# Patient Record
Sex: FEMALE | Race: WHITE | Employment: FULL TIME | URBAN - METROPOLITAN AREA
[De-identification: names, ages, dates, MRNs, and addresses within clinical notes are randomized per-mention and may not be internally consistent; named-entity substitution may affect disease eponyms.]

---

## 2017-10-13 ENCOUNTER — HOSPITAL ENCOUNTER (EMERGENCY)
Age: 22
Discharge: HOME | End: 2017-10-13
Attending: EMERGENCY MEDICINE | Admitting: EMERGENCY MEDICINE
Payer: OTHER GOVERNMENT

## 2017-10-13 VITALS
DIASTOLIC BLOOD PRESSURE: 66 MMHG | SYSTOLIC BLOOD PRESSURE: 126 MMHG | HEART RATE: 75 BPM | BODY MASS INDEX: 37.76 KG/M2 | HEIGHT: 61 IN | OXYGEN SATURATION: 100 % | WEIGHT: 200 LBS | TEMPERATURE: 98.6 F | RESPIRATION RATE: 14 BRPM

## 2017-10-13 DIAGNOSIS — N94.6 DYSMENORRHEA: Primary | ICD-10-CM

## 2017-10-13 LAB
ABO + RH BLD: NORMAL
ALBUMIN SERPL-MCNC: 4.4 G/DL (ref 3.4–5)
ALBUMIN/GLOB SERPL: 1.2 {RATIO} (ref 0.8–1.7)
ALP SERPL-CCNC: 69 U/L (ref 45–117)
ALT SERPL-CCNC: 23 U/L (ref 13–56)
ANION GAP SERPL CALC-SCNC: 5 MMOL/L (ref 3–18)
APPEARANCE UR: CLEAR
AST SERPL-CCNC: 13 U/L (ref 15–37)
BACTERIA URNS QL MICRO: NEGATIVE /HPF
BASOPHILS # BLD: 0 K/UL (ref 0–0.06)
BASOPHILS NFR BLD: 0 % (ref 0–2)
BILIRUB SERPL-MCNC: 0.3 MG/DL (ref 0.2–1)
BILIRUB UR QL: NEGATIVE
BUN SERPL-MCNC: 12 MG/DL (ref 7–18)
BUN/CREAT SERPL: 21 (ref 12–20)
CALCIUM SERPL-MCNC: 9.3 MG/DL (ref 8.5–10.1)
CHLORIDE SERPL-SCNC: 106 MMOL/L (ref 100–108)
CO2 SERPL-SCNC: 26 MMOL/L (ref 21–32)
COLOR UR: YELLOW
CREAT SERPL-MCNC: 0.58 MG/DL (ref 0.6–1.3)
DIFFERENTIAL METHOD BLD: ABNORMAL
EOSINOPHIL # BLD: 0.1 K/UL (ref 0–0.4)
EOSINOPHIL NFR BLD: 0 % (ref 0–5)
EPITH CASTS URNS QL MICRO: ABNORMAL /LPF (ref 0–5)
ERYTHROCYTE [DISTWIDTH] IN BLOOD BY AUTOMATED COUNT: 12.3 % (ref 11.6–14.5)
GLOBULIN SER CALC-MCNC: 3.8 G/DL (ref 2–4)
GLUCOSE SERPL-MCNC: 87 MG/DL (ref 74–99)
GLUCOSE UR STRIP.AUTO-MCNC: NEGATIVE MG/DL
HCG UR QL: NEGATIVE
HCT VFR BLD AUTO: 37 % (ref 35–45)
HGB BLD-MCNC: 12.8 G/DL (ref 12–16)
HGB UR QL STRIP: ABNORMAL
KETONES UR QL STRIP.AUTO: 15 MG/DL
LEUKOCYTE ESTERASE UR QL STRIP.AUTO: NEGATIVE
LIPASE SERPL-CCNC: 155 U/L (ref 73–393)
LYMPHOCYTES # BLD: 0.9 K/UL (ref 0.9–3.6)
LYMPHOCYTES NFR BLD: 6 % (ref 21–52)
MCH RBC QN AUTO: 29.6 PG (ref 24–34)
MCHC RBC AUTO-ENTMCNC: 34.6 G/DL (ref 31–37)
MCV RBC AUTO: 85.6 FL (ref 74–97)
MONOCYTES # BLD: 0.6 K/UL (ref 0.05–1.2)
MONOCYTES NFR BLD: 4 % (ref 3–10)
MUCOUS THREADS URNS QL MICRO: ABNORMAL /LPF
NEUTS SEG # BLD: 13.7 K/UL (ref 1.8–8)
NEUTS SEG NFR BLD: 90 % (ref 40–73)
NITRITE UR QL STRIP.AUTO: NEGATIVE
PH UR STRIP: 5.5 [PH] (ref 5–8)
PLATELET # BLD AUTO: 319 K/UL (ref 135–420)
PMV BLD AUTO: 10.1 FL (ref 9.2–11.8)
POTASSIUM SERPL-SCNC: 3.7 MMOL/L (ref 3.5–5.5)
PROT SERPL-MCNC: 8.2 G/DL (ref 6.4–8.2)
PROT UR STRIP-MCNC: ABNORMAL MG/DL
RBC # BLD AUTO: 4.32 M/UL (ref 4.2–5.3)
RBC #/AREA URNS HPF: ABNORMAL /HPF (ref 0–5)
SERVICE CMNT-IMP: NORMAL
SODIUM SERPL-SCNC: 137 MMOL/L (ref 136–145)
SP GR UR REFRACTOMETRY: >1.03 (ref 1–1.03)
UROBILINOGEN UR QL STRIP.AUTO: 0.2 EU/DL (ref 0.2–1)
WBC # BLD AUTO: 15.2 K/UL (ref 4.6–13.2)
WBC URNS QL MICRO: 0 /HPF (ref 0–4)
WET PREP GENITAL: NORMAL

## 2017-10-13 PROCEDURE — 99284 EMERGENCY DEPT VISIT MOD MDM: CPT

## 2017-10-13 PROCEDURE — 81001 URINALYSIS AUTO W/SCOPE: CPT | Performed by: EMERGENCY MEDICINE

## 2017-10-13 PROCEDURE — 81025 URINE PREGNANCY TEST: CPT | Performed by: EMERGENCY MEDICINE

## 2017-10-13 PROCEDURE — 87210 SMEAR WET MOUNT SALINE/INK: CPT | Performed by: PHYSICIAN ASSISTANT

## 2017-10-13 PROCEDURE — 87491 CHLMYD TRACH DNA AMP PROBE: CPT | Performed by: PHYSICIAN ASSISTANT

## 2017-10-13 PROCEDURE — 85025 COMPLETE CBC W/AUTO DIFF WBC: CPT | Performed by: EMERGENCY MEDICINE

## 2017-10-13 PROCEDURE — 86900 BLOOD TYPING SEROLOGIC ABO: CPT | Performed by: EMERGENCY MEDICINE

## 2017-10-13 PROCEDURE — 80053 COMPREHEN METABOLIC PANEL: CPT | Performed by: PHYSICIAN ASSISTANT

## 2017-10-13 PROCEDURE — 83690 ASSAY OF LIPASE: CPT | Performed by: PHYSICIAN ASSISTANT

## 2017-10-13 RX ORDER — ONDANSETRON 4 MG/1
4 TABLET, ORALLY DISINTEGRATING ORAL
Qty: 6 TAB | Refills: 0 | Status: SHIPPED | OUTPATIENT
Start: 2017-10-13

## 2017-10-13 RX ORDER — NAPROXEN 250 MG/1
250 TABLET ORAL 2 TIMES DAILY WITH MEALS
Qty: 10 TAB | Refills: 0 | Status: SHIPPED | OUTPATIENT
Start: 2017-10-13

## 2017-10-13 NOTE — ED TRIAGE NOTES
Pt arrives with c/o abd cramping that started yesterday, pt with spotting on 10/10, and started menses yesterday, pt denies any pain at this time but occurred yesterday and this am, pt attempted to take motrin/tylenol and pamprin for relief

## 2017-10-13 NOTE — ED NOTES
Provider in triage: 1 day really bad period pain  Ordered: hcg ua cbc t&s   DDx: period  Pain, ectopic, miscarriage  Sent to (MAIN treatment area, FAST track): fast

## 2017-10-13 NOTE — Clinical Note
Take Naproxen for menstrual cramping. Do not take on an empty stomach. Try applying heat to lower abdomen to ease cramping. You make take Zofran for nausea. Follow up with your gynecologist regarding menstrual symptoms.

## 2017-10-13 NOTE — ED PROVIDER NOTES
HPI Comments: Jayla Venegas is a 25 y.o. Female with history of dysmenorrhea who is currently on menses presents to the ED c/o 3 days of bilateral lower abdominal pain that patient describes as menstrual cramps. Patient reports initially pain was not controlled with motrin or Pamprin but today she took Pamprin and OxyContin that she had leftover from prior dental surgery with relief of pain when she arrived to ED and states she is currently asymptomatic. Patient reports decreased appetite today, which is typical when she is on her menses, and 1 episode of emesis after taking Pamprin and OxyContin on an empty stomach. Not currently nauseated. Patient reports hot/cold sweats and loose stool today consistent with menstrual symptoms for her. Patient states she has a history of severe menstrual cramps and symptoms since onset of menses at age 14. She saw GYN last year and was offered birth control for symptom control, however, declined such. Patient denies fevers, vaginal discharge or concern for STI. She reports she is in a monogamous relationship. No prior history of anemia or hematologic disorders. Patient is a 25 y.o. female presenting with abdominal pain. The history is provided by the patient. Abdominal Pain    Associated symptoms include diarrhea and vomiting. Pertinent negatives include no fever, no nausea, no constipation, no dysuria, no frequency and no back pain. Past Medical History:   Diagnosis Date    Menorrhagia with regular cycle        No past surgical history on file. Family History:   Problem Relation Age of Onset    No Known Problems Mother     Diabetes Father     Thyroid Disease Sister        Social History     Social History    Marital status:      Spouse name: N/A    Number of children: N/A    Years of education: N/A     Occupational History    Not on file.      Social History Main Topics    Smoking status: Never Smoker    Smokeless tobacco: Never Used  Alcohol use Yes    Drug use: Yes     Special: Marijuana    Sexual activity: Yes     Partners: Male     Birth control/ protection: None     Other Topics Concern    Not on file     Social History Narrative    No narrative on file         ALLERGIES: Review of patient's allergies indicates no known allergies. Review of Systems   Constitutional: Negative for fever. Gastrointestinal: Positive for abdominal pain, diarrhea and vomiting. Negative for blood in stool, constipation and nausea. Genitourinary: Positive for menstrual problem, pelvic pain and vaginal bleeding. Negative for dysuria, flank pain, frequency, urgency and vaginal discharge. Musculoskeletal: Negative for back pain. Skin: Negative for pallor. Vitals:    10/13/17 1235   BP: 128/90   Pulse: 78   Resp: 16   Temp: 97.3 °F (36.3 °C)   SpO2: 100%   Weight: 90.7 kg (200 lb)   Height: 5' 1\" (1.549 m)            Physical Exam   Constitutional: She is oriented to person, place, and time. She appears well-developed and well-nourished. No distress. HENT:   Head: Normocephalic and atraumatic. Right Ear: External ear normal.   Left Ear: External ear normal.   Eyes: Conjunctivae are normal. Pupils are equal, round, and reactive to light. No conjunctival pallor. Neck: Normal range of motion. Cardiovascular: Normal rate, regular rhythm, normal heart sounds and intact distal pulses. Pulmonary/Chest: Effort normal and breath sounds normal. No respiratory distress. She has no wheezes. She has no rales. Abdominal: Soft. Bowel sounds are normal. She exhibits no distension. There is no tenderness. There is no rebound and no guarding. Negative McBurney's point TTP. Negative Rovsing's sign. Negative Muphy's sign. No peritoneal signs. Genitourinary:   Genitourinary Comments: No CVAT bilaterally. Pelvic Exam:  Speculum and bimanual pelvic exam performed at bedside with chaperone assistance of ED Tech.  Normal exam of external labia majora and minora without apparent rash or lesions. Normal exam of vagina, cervix, uterus size and bilateral ovaries. No vaginal discharge in the vault or coming from the os. There was minimal vaginal bleeding. Cervical os was closed. No adnexal masses appreciated. Patient tolerated procedure well. No CMT. Samples obtained for GC/Chlamydia and wet prep, labeled at bedside and sent to lab.  1:38 PM     Neurological: She is alert and oriented to person, place, and time. Skin: Skin is warm and dry. She is not diaphoretic. Psychiatric: She has a normal mood and affect. Her behavior is normal.   Nursing note and vitals reviewed. MDM  Number of Diagnoses or Management Options  Dysmenorrhea:   Diagnosis management comments: Differential Diagnosis:  Menses, anovulation/DUB, spontaneous , threatened , pregnancy, uterine leiomyomas, cervical or endometrial polyps, pelvic tumors, atrophic endometrium, pelvic infections, exogenous hormone use, coagulopathy, trauma    Patient with h/o dysmenorrhea presenting to the ED with now resolved bilateral lower abdominal pain that she states is c/w menstrual cramps. Pain relieved with Pamprin and OxyContin prior to arrival. Large amount of blood on UA however patient is currently on menses, urine is unremarkable for infection, urine hcg is negative. H/H stable and patient without signs of anemia, white count slightly elevated at 15.3 however patient is pain free, afebrile, not tachycardiac. CMP and lipase are both within range. Pelvic examination without CMT or adnexal masses appreciated. HPI, exam and lab findings are most consistent with dysmenorrhea. Patient has a family history (sister) with PCOS. We discussed the importance of GYN follow up for control of symptoms and possible testing for causes of dysmenorrhea. Do not feel emergent imaging is warranted today based on patient's clinical findings.  Will discharge to home with GYN follow up, will prescribe naproxen for menstrual cramping and zofran for nausea. Patient advised to apply heat for cramps. She states she has pain medication at home if needed. Amount and/or Complexity of Data Reviewed  Clinical lab tests: ordered and reviewed    Patient Progress  Patient progress: stable    ED Course       Procedures  None. Diagnosis:   1. Dysmenorrhea          Disposition: Discharge to home with outpatient PCP/GYN follow up. Follow-up Information     Follow up With Details Comments Contact Info    Your gynecologist In 1 week      [facility] EMERGENCY DEPT  As needed, If symptoms worsen [id number]  [phone]          Patient's Medications   Start Taking    NAPROXEN (NAPROSYN) 250 MG TABLET    Take 1 Tab by mouth two (2) times daily (with meals). ONDANSETRON (ZOFRAN ODT) 4 MG DISINTEGRATING TABLET    Take 1 Tab by mouth every eight (8) hours as needed for Nausea.    Continue Taking    No medications on file   These Medications have changed    No medications on file   Stop Taking    No medications on file     Jerson Chowdary PA-C

## 2017-10-13 NOTE — DISCHARGE INSTRUCTIONS
Painful Menstrual Cramps: Care Instructions  Your Care Instructions    Painful menstrual cramps are very common. Many women go to the doctor because of bad cramps when they get their period. You may have cramps in your back, thighs, and belly. You may also have diarrhea, constipation, or nausea. Some women also get dizzy. Pain medicine and home treatment can help you feel better. Follow-up care is a key part of your treatment and safety. Be sure to make and go to all appointments, and call your doctor if you are having problems. It's also a good idea to know your test results and keep a list of the medicines you take. How can you care for yourself at home? · Take anti-inflammatory medicines for pain. Ibuprofen (Advil, Motrin) and naproxen (Aleve) usually work better than aspirin. ¨ Be safe with medicines. Talk to your doctor or pharmacist before you take any of these medicines. They may not be safe if you take other medicines or have other health problems. ¨ Start taking the recommended dose of pain medicine as soon as you start to feel pain. Or you can start on the day before your period. Keep taking the medicine for as many days as you have cramps. ¨ If anti-inflammatory medicines don't help, try acetaminophen (Tylenol). ¨ Do not take two or more pain medicines at the same time unless the doctor told you to. Many pain medicines have acetaminophen, which is Tylenol. Too much acetaminophen (Tylenol) can be harmful. ¨ Read and follow all instructions on the label. · Put a heating pad set on low or a hot water bottle on your belly. Or take a warm bath. Heat improves blood flow and may help with pain. · Lie down and put a pillow under your knees. Or lie on your side and bring your knees up to your chest. This will help with any back pressure. · Get at least 30 minutes of exercise on most days of the week. This improves blood flow and may decrease pain. Walking is a good choice.  You also may want to do other activities, such as running, swimming, cycling, or playing tennis or team sports. When should you call for help? Call 911 anytime you think you may need emergency care. For example, call if:  · You passed out (lost consciousness). Call your doctor now or seek immediate medical care if:  · You have sudden, severe pain in your belly or pelvis. · You have severe vaginal bleeding. This means that you are soaking through your usual pads or tampons every hour for 2 or more hours and passing clots of blood. · You are dizzy or lightheaded, or you feel like you may faint. Watch closely for changes in your health, and be sure to contact your doctor if:  · You think you may be pregnant. · You have new belly or pelvic pain. · You are taking pain medicine, but it is not helping. · You feel weak and tired. Where can you learn more? Go to http://valerie-parker.info/. Enter [id number] in the search box to learn more about \"Painful Menstrual Cramps: Care Instructions. \"  Current as of: October 13, 2016  Content Version: 11.3  © 0873-5968 [employer]. Care instructions adapted under license by [employer] (which disclaims liability or warranty for this information). If you have questions about a medical condition or this instruction, always ask your healthcare professional. [address] any warranty or liability for your use of this information.

## 2017-10-16 LAB
C TRACH RRNA SPEC QL NAA+PROBE: NEGATIVE
N GONORRHOEA RRNA SPEC QL NAA+PROBE: NEGATIVE
SPECIMEN SOURCE: NORMAL

## 2018-02-21 ENCOUNTER — HOSPITAL ENCOUNTER (OUTPATIENT)
Dept: LAB | Age: 23
Discharge: HOME | End: 2018-02-21

## 2018-02-21 LAB
RUBV IGG SER-IMP: NORMAL
T-SPOT TB TEST (EMPLOYEE),XTBE: NORMAL

## 2018-02-21 PROCEDURE — 86787 VARICELLA-ZOSTER ANTIBODY: CPT | Performed by: EMERGENCY MEDICINE

## 2018-02-21 PROCEDURE — 86735 MUMPS ANTIBODY: CPT | Performed by: EMERGENCY MEDICINE

## 2018-02-21 PROCEDURE — 86765 RUBEOLA ANTIBODY: CPT | Performed by: EMERGENCY MEDICINE

## 2018-02-21 PROCEDURE — 86762 RUBELLA ANTIBODY: CPT | Performed by: EMERGENCY MEDICINE

## 2018-02-21 PROCEDURE — 36415 COLL VENOUS BLD VENIPUNCTURE: CPT | Performed by: EMERGENCY MEDICINE

## 2018-02-21 PROCEDURE — 86706 HEP B SURFACE ANTIBODY: CPT | Performed by: EMERGENCY MEDICINE

## 2018-02-22 LAB
HBV SURFACE AB SER QL IA: POSITIVE
HBV SURFACE AB SERPL IA-ACNC: 15.18 MIU/ML
HEP BS AB COMMENT,HBSAC: NORMAL
MEV IGG SER IA-ACNC: 30.7 AU/ML
MUV IGG SER IA-ACNC: 14.8 AU/ML
VZV IGG SER IA-ACNC: <135 INDEX

## 2018-08-22 ENCOUNTER — HOSPITAL ENCOUNTER (OUTPATIENT)
Dept: ULTRASOUND IMAGING | Age: 23
Discharge: HOME | End: 2018-08-22
Attending: NURSE PRACTITIONER
Payer: OTHER GOVERNMENT

## 2018-08-22 DIAGNOSIS — N94.6 DYSMENORRHEA: ICD-10-CM

## 2018-08-22 PROCEDURE — 76830 TRANSVAGINAL US NON-OB: CPT

## 2018-11-09 ENCOUNTER — OFFICE VISIT (OUTPATIENT)
Dept: OBGYN CLINIC | Age: 23
End: 2018-11-09

## 2018-11-09 VITALS
DIASTOLIC BLOOD PRESSURE: 63 MMHG | HEIGHT: 62 IN | WEIGHT: 166 LBS | SYSTOLIC BLOOD PRESSURE: 110 MMHG | BODY MASS INDEX: 30.55 KG/M2 | HEART RATE: 79 BPM | OXYGEN SATURATION: 99 %

## 2018-11-09 DIAGNOSIS — N94.6 DYSMENORRHEA: Primary | ICD-10-CM

## 2018-11-09 RX ORDER — GLUCOSAMINE SULFATE 1500 MG
5000 POWDER IN PACKET (EA) ORAL DAILY
COMMUNITY

## 2018-11-09 RX ORDER — PHENTERMINE HYDROCHLORIDE 37.5 MG/1
37.5 CAPSULE ORAL
COMMUNITY

## 2018-11-09 NOTE — PATIENT INSTRUCTIONS
Painful Menstrual Cramps: Care Instructions Your Care Instructions Painful menstrual cramps are very common. Many women go to the doctor because of bad cramps when they get their period. You may have cramps in your back, thighs, and belly. You may also have diarrhea, constipation, or nausea. Some women also get dizzy. Pain medicine and home treatment can help you feel better. Follow-up care is a key part of your treatment and safety. Be sure to make and go to all appointments, and call your doctor if you are having problems. It's also a good idea to know your test results and keep a list of the medicines you take. How can you care for yourself at home? · Take anti-inflammatory medicines for pain. Ibuprofen (Advil, Motrin) and naproxen (Aleve) usually work better than aspirin. ? Be safe with medicines. Talk to your doctor or pharmacist before you take any of these medicines. They may not be safe if you take other medicines or have other health problems. ? Start taking the recommended dose of pain medicine as soon as you start to feel pain. Or you can start on the day before your period. Keep taking the medicine for as many days as you have cramps. ? If anti-inflammatory medicines don't help, try acetaminophen (Tylenol). ? Do not take two or more pain medicines at the same time unless the doctor told you to. Many pain medicines have acetaminophen, which is Tylenol. Too much acetaminophen (Tylenol) can be harmful. ? Read and follow all instructions on the label. · Put a heating pad set on low or a hot water bottle on your belly. Or take a warm bath. Heat improves blood flow and may help with pain. · Lie down and put a pillow under your knees. Or lie on your side and bring your knees up to your chest. This will help with any back pressure. · Get at least 30 minutes of exercise on most days of the week. This improves blood flow and may decrease pain. Walking is a good choice.  You also may want to do other activities, such as running, swimming, cycling, or playing tennis or team sports. When should you call for help? Call your doctor now or seek immediate medical care if: 
  · You have new or worse belly or pelvic pain.  
  · You have severe vaginal bleeding.  
 Watch closely for changes in your health, and be sure to contact your doctor if: 
  · You have unusual vaginal bleeding.  
  · You do not get better as expected. Where can you learn more? Go to http://valerie-parker.info/. Enter [id number] in the search box to learn more about \"Painful Menstrual Cramps: Care Instructions. \" Current as of: May 15, 2018 Content Version: 11.8 © 1121-1807 [employer]. Care instructions adapted under license by [employer] (which disclaims liability or warranty for this information). If you have questions about a medical condition or this instruction, always ask your healthcare professional. [place] [address] any warranty or liability for your use of this information. Endometriosis: Care Instructions Your Care Instructions Cells that are like the cells that line the inside your womb (uterus) sometimes grow on the outside of the uterus. This is called endometriosis. These clumps of cells can cause pain and problems with your periods. They can become inflamed and may bleed. Scar tissue that forms over time can make it difficult to get pregnant. Medicines and sometimes surgery can relieve pain and help women who want to get pregnant. Follow-up care is a key part of your treatment and safety. Be sure to make and go to all appointments, and call your doctor if you are having problems. It's also a good idea to know your test results and keep a list of the medicines you take. How can you care for yourself at home? · Take your medicines exactly as prescribed. Call your doctor if you think you are having a problem with your medicine. · Take pain medicines exactly as directed. ? If the doctor gave you a prescription medicine for pain, take it as prescribed. ? If you are not taking a prescription pain medicine, ask your doctor if you can take an over-the-counter medicine. · Apply heat, such as a hot water bottle or a heating pad set on low, to your lower belly. Or take a warm bath. Heat may relieve pain. · Lie down and put a pillow under your knees to raise your legs. This will relieve pressure on your back. When should you call for help? Call your doctor now or seek immediate medical care if: 
  · You have severe vaginal bleeding.  
  · You have new or worse pain in your belly or pelvis.  
 Watch closely for changes in your health, and be sure to contact your doctor if: 
  · You have unusual vaginal bleeding.  
  · You do not get better as expected. Where can you learn more? Go to http://valerie-parker.info/. Enter P869 in the search box to learn more about \"Endometriosis: Care Instructions. \" Current as of: May 15, 2018 Content Version: 11.8 © 8315-1263 [employer]. Care instructions adapted under license by [employer] (which disclaims liability or warranty for this information). If you have questions about a medical condition or this instruction, always ask your healthcare professional. [address] any warranty or liability for your use of this information.

## 2018-11-09 NOTE — PROGRESS NOTES
Patient presents reporting pain with menstrual cycles. She states that her menses started when she was 15 y/o and after having it for 6 months began to have severe pain with each menstrual cycle. Over the years, she has taken both ibuprofen and aspirin in an alternating fashion for relief. She continues, however, to have severe pain with her menstrual cycles. I discussed her most recent pelvic ultrasound in August which was normal. There was no indication of a large ovarian cyst, uterine fibroids, or PCOS. I discussed treatment options with patient including: NSAIDs, contraception, or diagnostic laparoscopy to rule out endometriosis. She declined all options. I also discussed her having an annual exam with STD screening to ensure we're ruling out all possible causes of cervical inflammation. She will return for an annual exam or prn.